# Patient Record
Sex: FEMALE | Race: WHITE | Employment: OTHER | URBAN - METROPOLITAN AREA
[De-identification: names, ages, dates, MRNs, and addresses within clinical notes are randomized per-mention and may not be internally consistent; named-entity substitution may affect disease eponyms.]

---

## 2022-06-02 ENCOUNTER — HOSPITAL ENCOUNTER (OUTPATIENT)
Dept: LAB | Age: 57
Discharge: HOME | End: 2022-06-02

## 2022-08-14 ENCOUNTER — HOSPITAL ENCOUNTER (EMERGENCY)
Age: 57
Discharge: HOME | End: 2022-08-14
Attending: EMERGENCY MEDICINE
Payer: COMMERCIAL

## 2022-08-14 ENCOUNTER — APPOINTMENT (OUTPATIENT)
Dept: GENERAL RADIOLOGY | Age: 57
End: 2022-08-14
Attending: EMERGENCY MEDICINE
Payer: COMMERCIAL

## 2022-08-14 VITALS
WEIGHT: 146.16 LBS | HEIGHT: 67 IN | BODY MASS INDEX: 22.94 KG/M2 | HEART RATE: 60 BPM | OXYGEN SATURATION: 95 % | DIASTOLIC BLOOD PRESSURE: 64 MMHG | SYSTOLIC BLOOD PRESSURE: 102 MMHG | TEMPERATURE: 97.7 F | RESPIRATION RATE: 11 BRPM

## 2022-08-14 DIAGNOSIS — R07.9 CHEST PAIN, UNSPECIFIED TYPE: Primary | ICD-10-CM

## 2022-08-14 LAB
ALBUMIN SERPL-MCNC: 4.2 G/DL (ref 3.5–5)
ALBUMIN/GLOB SERPL: 1.3 {RATIO} (ref 1.1–2.2)
ALP SERPL-CCNC: 54 U/L (ref 45–117)
ALT SERPL-CCNC: 25 U/L (ref 12–78)
ANION GAP SERPL CALC-SCNC: 7 MMOL/L (ref 5–15)
AST SERPL-CCNC: 17 U/L (ref 15–37)
ATRIAL RATE: 68 BPM
BASOPHILS # BLD: 0.1 K/UL (ref 0–0.1)
BASOPHILS NFR BLD: 1 % (ref 0–1)
BILIRUB SERPL-MCNC: 0.5 MG/DL (ref 0.2–1)
BUN SERPL-MCNC: 17 MG/DL (ref 6–20)
BUN/CREAT SERPL: 22 (ref 12–20)
CALCIUM SERPL-MCNC: 9 MG/DL (ref 8.5–10.1)
CALCULATED P AXIS, ECG09: -20 DEGREES
CALCULATED R AXIS, ECG10: 67 DEGREES
CALCULATED T AXIS, ECG11: -51 DEGREES
CHLORIDE SERPL-SCNC: 102 MMOL/L (ref 97–108)
CO2 SERPL-SCNC: 29 MMOL/L (ref 21–32)
COMMENT, HOLDF: NORMAL
CREAT SERPL-MCNC: 0.78 MG/DL (ref 0.55–1.02)
DIAGNOSIS, 93000: NORMAL
DIFFERENTIAL METHOD BLD: ABNORMAL
EOSINOPHIL # BLD: 0.4 K/UL (ref 0–0.4)
EOSINOPHIL NFR BLD: 8 % (ref 0–7)
ERYTHROCYTE [DISTWIDTH] IN BLOOD BY AUTOMATED COUNT: 12.9 % (ref 11.5–14.5)
GLOBULIN SER CALC-MCNC: 3.2 G/DL (ref 2–4)
GLUCOSE SERPL-MCNC: 105 MG/DL (ref 65–100)
HCT VFR BLD AUTO: 41.9 % (ref 35–47)
HGB BLD-MCNC: 13.9 G/DL (ref 11.5–16)
IMM GRANULOCYTES # BLD AUTO: 0 K/UL (ref 0–0.04)
IMM GRANULOCYTES NFR BLD AUTO: 0 % (ref 0–0.5)
LYMPHOCYTES # BLD: 1.2 K/UL (ref 0.8–3.5)
LYMPHOCYTES NFR BLD: 26 % (ref 12–49)
MCH RBC QN AUTO: 28.4 PG (ref 26–34)
MCHC RBC AUTO-ENTMCNC: 33.2 G/DL (ref 30–36.5)
MCV RBC AUTO: 85.5 FL (ref 80–99)
MONOCYTES # BLD: 0.4 K/UL (ref 0–1)
MONOCYTES NFR BLD: 10 % (ref 5–13)
NEUTS SEG # BLD: 2.4 K/UL (ref 1.8–8)
NEUTS SEG NFR BLD: 55 % (ref 32–75)
NRBC # BLD: 0 K/UL (ref 0–0.01)
NRBC BLD-RTO: 0 PER 100 WBC
P-R INTERVAL, ECG05: 184 MS
PLATELET # BLD AUTO: 196 K/UL (ref 150–400)
PMV BLD AUTO: 10.4 FL (ref 8.9–12.9)
POTASSIUM SERPL-SCNC: 3.9 MMOL/L (ref 3.5–5.1)
PROT SERPL-MCNC: 7.4 G/DL (ref 6.4–8.2)
Q-T INTERVAL, ECG07: 386 MS
QRS DURATION, ECG06: 80 MS
QTC CALCULATION (BEZET), ECG08: 410 MS
RBC # BLD AUTO: 4.9 M/UL (ref 3.8–5.2)
SAMPLES BEING HELD,HOLD: NORMAL
SODIUM SERPL-SCNC: 138 MMOL/L (ref 136–145)
TROPONIN-HIGH SENSITIVITY: 6 NG/L (ref 0–51)
VENTRICULAR RATE, ECG03: 68 BPM
WBC # BLD AUTO: 4.4 K/UL (ref 3.6–11)

## 2022-08-14 PROCEDURE — 80053 COMPREHEN METABOLIC PANEL: CPT

## 2022-08-14 PROCEDURE — 85025 COMPLETE CBC W/AUTO DIFF WBC: CPT

## 2022-08-14 PROCEDURE — 36415 COLL VENOUS BLD VENIPUNCTURE: CPT

## 2022-08-14 PROCEDURE — 99285 EMERGENCY DEPT VISIT HI MDM: CPT

## 2022-08-14 PROCEDURE — 84484 ASSAY OF TROPONIN QUANT: CPT

## 2022-08-14 PROCEDURE — 71046 X-RAY EXAM CHEST 2 VIEWS: CPT

## 2022-08-14 PROCEDURE — 93005 ELECTROCARDIOGRAM TRACING: CPT

## 2022-08-14 RX ORDER — IVERMECTIN 10 MG/G
CREAM TOPICAL
COMMUNITY

## 2022-08-14 NOTE — ED TRIAGE NOTES
Pt presents to ED with c/o intermittent chest pain over the past month, she states she has had some pain in neck jaw and shoulder but she states she grinds her teeth and has some issues with her shoulder. She denies any dyspnea, dizziness or nausea, vomiting.

## 2022-08-14 NOTE — ED PROVIDER NOTES
50-year-old female presents from home accompanied by her  with a complaint of chest pain. Patient states that she has had symptoms off and on for the past month or so. She describes it as a tight squeezing sensation below her left breast.  Sometimes it radiates into her left arm. This is not related to exertion or position and can happen at any time. She states this symptom usually lasts for a minute or 2 and then resolves on its own. No exacerbating or alleviating factors. She denies any associated symptoms like cough, shortness of breath, diaphoresis, nausea. She has not taken any medications for the symptoms. She had an episode of pain this morning that resolved on its own after 2 hours but this is what prompted her to come to the ER and be evaluated. She denies any history of heart disease. Does not take any daily medications. She denies having a primary care doctor. States she saw cardiologist 15 years ago for palpitations but has not seen them since. No other complaints at this time. History reviewed. No pertinent past medical history.     Past Surgical History:   Procedure Laterality Date    HX HEENT           Family History:   Problem Relation Age of Onset    Hypertension Father     Diabetes Father        Social History     Socioeconomic History    Marital status:      Spouse name: Not on file    Number of children: Not on file    Years of education: Not on file    Highest education level: Not on file   Occupational History    Not on file   Tobacco Use    Smoking status: Never    Smokeless tobacco: Never   Substance and Sexual Activity    Alcohol use: Yes     Comment: 3    Drug use: No    Sexual activity: Yes     Partners: Male     Birth control/protection: Surgical   Other Topics Concern    Not on file   Social History Narrative    Not on file     Social Determinants of Health     Financial Resource Strain: Not on file   Food Insecurity: Not on file   Transportation Needs: Not on file   Physical Activity: Not on file   Stress: Not on file   Social Connections: Not on file   Intimate Partner Violence: Not on file   Housing Stability: Not on file         ALLERGIES: Compazine [prochlorperazine edisylate] and Sulfa (sulfonamide antibiotics)    Review of Systems   Constitutional:  Negative for fever. HENT:  Negative for facial swelling. Eyes:  Negative for visual disturbance. Respiratory:  Positive for chest tightness. Cardiovascular:  Negative for chest pain. Gastrointestinal:  Negative for abdominal pain. Genitourinary:  Negative for difficulty urinating and dysuria. Musculoskeletal:  Negative for arthralgias. Skin:  Negative for rash. Neurological:  Negative for headaches. Hematological:  Negative for adenopathy. Psychiatric/Behavioral:  Negative for suicidal ideas. Vitals:    08/14/22 0827 08/14/22 0845 08/14/22 0848 08/14/22 0902   BP: 130/70 109/65 121/74 118/65   Pulse: 75 71 67    Resp: 19 19 13    Temp: 97.7 °F (36.5 °C)      SpO2: 98% 96% 96%    Weight: 66.3 kg (146 lb 2.6 oz)      Height: 5' 7\" (1.702 m)               Physical Exam  Vitals and nursing note reviewed. Constitutional:       General: She is not in acute distress. Appearance: She is well-developed. HENT:      Head: Normocephalic and atraumatic. Eyes:      General: No scleral icterus. Conjunctiva/sclera: Conjunctivae normal.      Pupils: Pupils are equal, round, and reactive to light. Cardiovascular:      Rate and Rhythm: Normal rate. Heart sounds: No murmur heard. Pulmonary:      Effort: Pulmonary effort is normal. No respiratory distress. Abdominal:      General: There is no distension. Musculoskeletal:         General: Normal range of motion. Cervical back: Normal range of motion and neck supple. Skin:     General: Skin is warm and dry. Findings: No rash. Neurological:      Mental Status: She is alert and oriented to person, place, and time. MDM  Number of Diagnoses or Management Options  Chest pain, unspecified type  Diagnosis management comments: Assessment: Patient here with brief, transient episodes of chest pain off and on for the past month. They are atypical with no exacerbating or alleviating factors. No associated symptoms. Patient very low risk with heart score of 1. Her cardiac enzymes and EKG were unremarkable here. Remainder of the blood test was reassuring. Patient is currently resting comfortable without pain at this time. I think she stable for discharge home. I encouraged her to follow-up with cardiology as she may benefit from a stress test.  She also needs to follow-up with primary care if she is well overdue for health maintenance check. I encouraged her to return to the emergency department if she has any new and concerning symptoms. Amount and/or Complexity of Data Reviewed  Clinical lab tests: reviewed  Tests in the radiology section of CPT®: reviewed      ED Course as of 08/14/22 0944   Sun Aug 14, 2022   0830 EKG, 12 LEAD, INITIAL  ED EKG interpretation:  Rhythm: normal sinus rhythm. Rate (approx.): 68.  Axis: normal.  ST segment:  No concerning ST elevations or depressions. This EKG was interpreted by John Skaggs MD,ED Provider.      [JM]      ED Course User Index  [JM] Poornima Brush MD       Procedures

## 2022-09-15 ENCOUNTER — LAB ONLY (OUTPATIENT)
Dept: FAMILY MEDICINE CLINIC | Age: 57
End: 2022-09-15

## 2022-09-15 ENCOUNTER — OFFICE VISIT (OUTPATIENT)
Dept: FAMILY MEDICINE CLINIC | Age: 57
End: 2022-09-15
Payer: COMMERCIAL

## 2022-09-15 VITALS
BODY MASS INDEX: 22.91 KG/M2 | RESPIRATION RATE: 16 BRPM | HEIGHT: 67 IN | DIASTOLIC BLOOD PRESSURE: 70 MMHG | OXYGEN SATURATION: 98 % | SYSTOLIC BLOOD PRESSURE: 112 MMHG | TEMPERATURE: 97.8 F | HEART RATE: 67 BPM | WEIGHT: 146 LBS

## 2022-09-15 DIAGNOSIS — G47.00 INSOMNIA, UNSPECIFIED TYPE: ICD-10-CM

## 2022-09-15 DIAGNOSIS — Z13.29 SCREENING FOR THYROID DISORDER: ICD-10-CM

## 2022-09-15 DIAGNOSIS — Z76.89 ESTABLISHING CARE WITH NEW DOCTOR, ENCOUNTER FOR: ICD-10-CM

## 2022-09-15 DIAGNOSIS — F41.9 ANXIETY: Primary | ICD-10-CM

## 2022-09-15 DIAGNOSIS — Z13.21 ENCOUNTER FOR VITAMIN DEFICIENCY SCREENING: ICD-10-CM

## 2022-09-15 DIAGNOSIS — Z13.220 SCREENING FOR CHOLESTEROL LEVEL: ICD-10-CM

## 2022-09-15 DIAGNOSIS — Z11.59 ENCOUNTER FOR HEPATITIS C SCREENING TEST FOR LOW RISK PATIENT: ICD-10-CM

## 2022-09-15 DIAGNOSIS — F41.9 ANXIETY: ICD-10-CM

## 2022-09-15 DIAGNOSIS — Z12.11 SCREEN FOR COLON CANCER: ICD-10-CM

## 2022-09-15 PROCEDURE — 99203 OFFICE O/P NEW LOW 30 MIN: CPT | Performed by: NURSE PRACTITIONER

## 2022-09-15 RX ORDER — KETOCONAZOLE 20 MG/G
CREAM TOPICAL
COMMUNITY
Start: 2022-07-22 | End: 2022-09-15 | Stop reason: ALTCHOICE

## 2022-09-15 RX ORDER — CETIRIZINE HYDROCHLORIDE 10 MG/1
CAPSULE, LIQUID FILLED ORAL
COMMUNITY

## 2022-09-15 NOTE — PROGRESS NOTES
HISTORY OF PRESENT ILLNESS  Elena Cramer is a 65 y.o. female. HPI  Pt presents as a new patient to establish care  She states that she has not seen PCP in years  She has been dealing with anxiety and sleep issues  She believes that there is just too much going on in her life currently. Her mother passed away in the past 2 years, her father has been diagnosed with alzheimer's and is moving in with her, etc.  She states that she will wake up around 2-3 am and is unable to fall back asleep. She has a good support system and a lot of friends, so feels as though she does not need therapy/counseling  She is interested in getting baseline blood work, to see if this could be making any of her symptoms worse. Pt declines any interest in medication for anxiety and/or depression at this time, as she would rather find long term solutions. She declines all HM topics due (mammogram, pap smear, etc). Review of Systems   Constitutional:  Negative for fever. Psychiatric/Behavioral:  The patient is nervous/anxious and has insomnia. Physical Exam  Constitutional:       Appearance: Normal appearance. Cardiovascular:      Rate and Rhythm: Normal rate and regular rhythm. Heart sounds: Normal heart sounds. Pulmonary:      Effort: Pulmonary effort is normal.      Breath sounds: Normal breath sounds. Neurological:      Mental Status: She is alert. Psychiatric:         Mood and Affect: Mood normal.         Behavior: Behavior normal.       ASSESSMENT and PLAN    ICD-10-CM ICD-9-CM    1. Anxiety  F41.9 300.00 CBC WITH AUTOMATED DIFF      METABOLIC PANEL, COMPREHENSIVE      LIPID PANEL      THYROID CASCADE PROFILE      HEPATITIS C AB      2. Establishing care with new doctor, encounter for  Z76.89 V65.8       3. Insomnia, unspecified type  G47.00 780.52       4. Screening for cholesterol level  Z13.220 V77.91 LIPID PANEL      5. Screening for thyroid disorder  Z13.29 V77.0 THYROID CASCADE PROFILE      6. Encounter for hepatitis C screening test for low risk patient  Z11.59 V73.89 HEPATITIS C AB      7. Encounter for vitamin deficiency screening  Z13.21 V77.99 VITAMIN D, 25 HYDROXY      8. Screen for colon cancer  Z12.11 V76.51 OCCULT BLOOD IMMUNOASSAY,DIAGNOSTIC      OCCULT BLOOD IMMUNOASSAY,DIAGNOSTIC      Will notify when labs return, and inform her of any change in plan of care at that time  Educated that should she change her mind about wanting to start anything for anxiety/depression, she should notify office. Pt informed to return to office with worsening of symptoms, or PRN with any questions or concerns. Pt verbalizes understanding of plan of care and denies further questions or concerns at this time.

## 2022-09-15 NOTE — PROGRESS NOTES
Identified pt with two pt identifiers(name and ). Chief Complaint   Patient presents with    New Patient    Labs     fasting    Sleep Problem    Anxiety        Health Maintenance Due   Topic    Hepatitis C Screening     Depression Screen     COVID-19 Vaccine (1)    Cervical cancer screen     Colorectal Cancer Screening Combo     Shingrix Vaccine Age 50> (1 of 2)    Breast Cancer Screen Mammogram     Lipid Screen     Flu Vaccine (1)       Wt Readings from Last 3 Encounters:   22 146 lb 2.6 oz (66.3 kg)   14 143 lb 9.6 oz (65.1 kg)   11 144 lb 3.2 oz (65.4 kg)     Temp Readings from Last 3 Encounters:   22 97.7 °F (36.5 °C)   14 98.5 °F (36.9 °C) (Oral)     BP Readings from Last 3 Encounters:   22 102/64   14 105/60   11 106/80     Pulse Readings from Last 3 Encounters:   22 60   14 78   11 70         Learning Assessment:  :     Learning Assessment 2014   PRIMARY LEARNER Patient   PRIMARY LANGUAGE ENGLISH   LEARNER PREFERENCE PRIMARY READING   ANSWERED BY self   RELATIONSHIP SELF       Depression Screening:  :     3 most recent PHQ Screens 9/15/2022   Little interest or pleasure in doing things Not at all   Feeling down, depressed, irritable, or hopeless Not at all   Total Score PHQ 2 0       Fall Risk Assessment:  :     No flowsheet data found. Abuse Screening:  :     Abuse Screening Questionnaire 9/15/2022   Do you ever feel afraid of your partner? N   Are you in a relationship with someone who physically or mentally threatens you? N   Is it safe for you to go home? Y       Coordination of Care Questionnaire:  :     1) Have you been to an emergency room, urgent care clinic since your last visit?  yes [facility] chest pain a few weeks ago   Hospitalized since your last visit? no             2) Have you seen or consulted any other health care providers outside of [address] [place] [address] since your last visit? no  (Include any pap smears or colon screenings in this section.)    3) Do you have an Advance Directive on file? no  Are you interested in receiving information about Advance Directives? no    Patient is accompanied by N/A I have received verbal consent from Fatimah Johnson to discuss any/all medical information while they are present in the room. 4.  For patients aged 39-70: Has the patient had a colonoscopy / FIT/ Cologuard? No      If the patient is female:    5. For patients aged 41-77: Has the patient had a mammogram within the past 2 years? No      6. For patients aged 21-65: Has the patient had a pap smear?  No

## 2022-09-16 ENCOUNTER — TELEPHONE (OUTPATIENT)
Dept: FAMILY MEDICINE CLINIC | Age: 57
End: 2022-09-16

## 2022-09-16 DIAGNOSIS — E55.9 VITAMIN D DEFICIENCY: Primary | ICD-10-CM

## 2022-09-16 LAB
25(OH)D3 SERPL-MCNC: 18.1 NG/ML (ref 30–100)
ALBUMIN SERPL-MCNC: 4.4 G/DL (ref 3.5–5)
ALBUMIN/GLOB SERPL: 1.4 {RATIO} (ref 1.1–2.2)
ALP SERPL-CCNC: 60 U/L (ref 45–117)
ALT SERPL-CCNC: 26 U/L (ref 12–78)
ANION GAP SERPL CALC-SCNC: 2 MMOL/L (ref 5–15)
AST SERPL-CCNC: 16 U/L (ref 15–37)
BASOPHILS # BLD: 0.1 K/UL (ref 0–0.1)
BASOPHILS NFR BLD: 1 % (ref 0–1)
BILIRUB SERPL-MCNC: 0.5 MG/DL (ref 0.2–1)
BUN SERPL-MCNC: 15 MG/DL (ref 6–20)
BUN/CREAT SERPL: 22 (ref 12–20)
CALCIUM SERPL-MCNC: 9.5 MG/DL (ref 8.5–10.1)
CHLORIDE SERPL-SCNC: 105 MMOL/L (ref 97–108)
CHOLEST SERPL-MCNC: 241 MG/DL
CO2 SERPL-SCNC: 31 MMOL/L (ref 21–32)
CREAT SERPL-MCNC: 0.68 MG/DL (ref 0.55–1.02)
DIFFERENTIAL METHOD BLD: NORMAL
EOSINOPHIL # BLD: 0.3 K/UL (ref 0–0.4)
EOSINOPHIL NFR BLD: 7 % (ref 0–7)
ERYTHROCYTE [DISTWIDTH] IN BLOOD BY AUTOMATED COUNT: 12.7 % (ref 11.5–14.5)
GLOBULIN SER CALC-MCNC: 3.1 G/DL (ref 2–4)
GLUCOSE SERPL-MCNC: 97 MG/DL (ref 65–100)
HCT VFR BLD AUTO: 44.4 % (ref 35–47)
HCV AB SERPL QL IA: NONREACTIVE
HDLC SERPL-MCNC: 67 MG/DL
HDLC SERPL: 3.6 {RATIO} (ref 0–5)
HGB BLD-MCNC: 14.2 G/DL (ref 11.5–16)
IMM GRANULOCYTES # BLD AUTO: 0 K/UL (ref 0–0.04)
IMM GRANULOCYTES NFR BLD AUTO: 0 % (ref 0–0.5)
LDLC SERPL CALC-MCNC: 158 MG/DL (ref 0–100)
LYMPHOCYTES # BLD: 1.1 K/UL (ref 0.8–3.5)
LYMPHOCYTES NFR BLD: 25 % (ref 12–49)
MCH RBC QN AUTO: 28.4 PG (ref 26–34)
MCHC RBC AUTO-ENTMCNC: 32 G/DL (ref 30–36.5)
MCV RBC AUTO: 88.8 FL (ref 80–99)
MONOCYTES # BLD: 0.4 K/UL (ref 0–1)
MONOCYTES NFR BLD: 8 % (ref 5–13)
NEUTS SEG # BLD: 2.7 K/UL (ref 1.8–8)
NEUTS SEG NFR BLD: 59 % (ref 32–75)
NRBC # BLD: 0 K/UL (ref 0–0.01)
NRBC BLD-RTO: 0 PER 100 WBC
PLATELET # BLD AUTO: 223 K/UL (ref 150–400)
PMV BLD AUTO: 10.9 FL (ref 8.9–12.9)
POTASSIUM SERPL-SCNC: 5.1 MMOL/L (ref 3.5–5.1)
PROT SERPL-MCNC: 7.5 G/DL (ref 6.4–8.2)
RBC # BLD AUTO: 5 M/UL (ref 3.8–5.2)
SODIUM SERPL-SCNC: 138 MMOL/L (ref 136–145)
TRIGL SERPL-MCNC: 80 MG/DL (ref ?–150)
VLDLC SERPL CALC-MCNC: 16 MG/DL
WBC # BLD AUTO: 4.5 K/UL (ref 3.6–11)

## 2022-09-16 RX ORDER — ERGOCALCIFEROL 1.25 MG/1
50000 CAPSULE ORAL
Qty: 12 CAPSULE | Refills: 1 | Status: SHIPPED | OUTPATIENT
Start: 2022-09-16

## 2022-09-16 NOTE — TELEPHONE ENCOUNTER
----- Message from Vita Osler, NP sent at 9/16/2022  2:11 PM EDT -----  Please call patient and let her know that her labs returned, awaiting thyroid. 1.  Vit D is very low. I have sent in rx and she should take as prescribed  2. Cholesterol is elevated, but HDL is also elevated which is cardioprotective.   Should focus on mediterranean diet, decreased red meat, fatty fried foods, etc.    Should re-check in 6 months, fasting

## 2022-09-16 NOTE — PROGRESS NOTES
Please call patient and let her know that her labs returned, awaiting thyroid. 1.  Vit D is very low. I have sent in rx and she should take as prescribed  2. Cholesterol is elevated, but HDL is also elevated which is cardioprotective.   Should focus on mediterranean diet, decreased red meat, fatty fried foods, etc.    Should re-check in 6 months, fasting

## 2022-09-17 LAB — TSH SERPL-ACNC: 0.88 UIU/ML (ref 0.45–4.5)

## 2022-09-22 LAB — HEMOCCULT STL QL IA: NEGATIVE

## 2022-09-23 ENCOUNTER — TELEPHONE (OUTPATIENT)
Dept: FAMILY MEDICINE CLINIC | Age: 57
End: 2022-09-23

## 2022-09-23 NOTE — TELEPHONE ENCOUNTER
----- Message from Gniette Cooney NP sent at 9/23/2022  7:45 AM EDT -----  Please call patient and let her know that her occult blood test returned negative, normal  Thanks

## 2022-10-03 ENCOUNTER — OFFICE VISIT (OUTPATIENT)
Dept: CARDIOLOGY CLINIC | Age: 57
End: 2022-10-03
Payer: COMMERCIAL

## 2022-10-03 VITALS
SYSTOLIC BLOOD PRESSURE: 118 MMHG | BODY MASS INDEX: 23.35 KG/M2 | OXYGEN SATURATION: 98 % | HEIGHT: 67 IN | WEIGHT: 148.8 LBS | HEART RATE: 82 BPM | DIASTOLIC BLOOD PRESSURE: 80 MMHG

## 2022-10-03 DIAGNOSIS — R07.9 CHEST PAIN, UNSPECIFIED TYPE: ICD-10-CM

## 2022-10-03 DIAGNOSIS — E78.5 DYSLIPIDEMIA: Primary | ICD-10-CM

## 2022-10-03 DIAGNOSIS — E55.9 VITAMIN D DEFICIENCY: ICD-10-CM

## 2022-10-03 DIAGNOSIS — Z76.89 ESTABLISHING CARE WITH NEW DOCTOR, ENCOUNTER FOR: ICD-10-CM

## 2022-10-03 PROCEDURE — 93000 ELECTROCARDIOGRAM COMPLETE: CPT | Performed by: INTERNAL MEDICINE

## 2022-10-03 PROCEDURE — 99204 OFFICE O/P NEW MOD 45 MIN: CPT | Performed by: INTERNAL MEDICINE

## 2022-10-03 NOTE — PROGRESS NOTES
Chief Complaint   Patient presents with    New Patient    Hospital Follow Up     8-14-22 [facility]    Chest Pain    Cholesterol Problem       Vitals:    10/03/22 1333   BP: 118/80   Pulse: 82   Height: 5' 7\" (1.702 m)   Weight: 148 lb 12.8 oz (67.5 kg)   SpO2: 98%         Chest pain: Yes, Pt stated anxiety-L side pressure    SOB: no    Palpitations: racing- Random     Dizziness: no    Swelling: no    Refills:       Have you been to the ER, urgent care clinic since your last visit? Hospitalized since your last visit? Have you sen or consulted other health care providers outside of the [facility] since your last visit?  (Include any pap smears or colon screening.)

## 2022-10-03 NOTE — PROGRESS NOTES
Reason to see patient: Chest pain. Referring: Tatianna Padilla NP    HPI: Shonda Downing is a 65 y.o. female with no significant past medical history is here for evaluation of symptoms of chest pain. Symptoms described as having chest pressure-like sensation. Symptoms have been present from last few weeks. Symptoms are off and on. Chest pressure described as mild to moderate in intensity anterior chest wall nonradiating not associated with shortness of breath, palpitations, lightheadedness or dizziness. Symptoms could be present at any times but there is no relationship to exertion. There is no aggravating or relieving factors. Had been seen in the ER on August 14, 2022 and all the lab results including EKGs were normal.  She was asked to follow-up with cardiology. She had in the interim seen primary care physician and had lab work performed. Her most recent cholesterol levels were elevated as well as her vitamin D levels were low. EKG in my office today demonstrated normal sinus rhythm, normal axis, normal intervals, normal ST segment. Lab results were personally reviewed as documented above. She does not smoke tobacco.  She will occasionally drink alcohol socially. She likes to drink wine or cider. Family history significant for mother having pulmonary fibrosis past 2 years ago, father has Alzheimer's disease as well as coronary disease with stenting performed in the last 10 to 20 years. He is at [de-identified]years of age. Socially she has significant stress due to father's Alzheimer and also evaluation of her home. Plan:    1. Chest pain: Symptoms suggestive of atypical angina likely due to reflux related could be exacerbated by current stressful situations at home. Nevertheless given her age as well as family history of CAD we will proceed with some basic testing. We will perform coronary calcium score.   Also check a treadmill exercise echocardiogram.  If these results are fairly normal then no further testing is needed. Continue taking Tums as needed. May require PPI. 2.  Dyslipidemia: Most recent LDL level was 158 with a total cholesterol of 241. Would recommend dietary observation as well as rechecking these levels within the next 2 months. 3.  Low vitamin D levels: Continue lifelong supplementation of vitamin D.    4. See Dr. Tone Maldonado in 1 month. Her kids are grown and her son is getting  in 2days. Her Father has Alziemers and had CAD, PCI. Mother had pulmonary fibrosis. ATTENTION:   This medical record was transcribed using an electronic medical records/speech recognition system. Although proofread, it may and can contain electronic, spelling and other errors. Corrections may be executed at a later time. Please feel free to contact us for any clarifications as needed.       Past Medical History:   Diagnosis Date    Anxiety             Past Surgical History:   Procedure Laterality Date    HX HEENT               Family History   Problem Relation Age of Onset    Hypertension Father     Diabetes Father            Social History     Socioeconomic History    Marital status:      Spouse name: Not on file    Number of children: Not on file    Years of education: Not on file    Highest education level: Not on file   Occupational History    Not on file   Tobacco Use    Smoking status: Never    Smokeless tobacco: Never   Substance and Sexual Activity    Alcohol use: Yes     Comment: 3    Drug use: No    Sexual activity: Yes     Partners: Male     Birth control/protection: Surgical   Other Topics Concern    Not on file   Social History Narrative    Not on file     Social Determinants of Health     Financial Resource Strain: Not on file   Food Insecurity: Not on file   Transportation Needs: Not on file   Physical Activity: Not on file   Stress: Not on file   Social Connections: Not on file   Intimate Partner Violence: Not on file   Housing Stability: Not on file         Allergies   Allergen Reactions    Compazine [Prochlorperazine Edisylate] Anaphylaxis    Sulfa (Sulfonamide Antibiotics) Rash            Current Outpatient Medications   Medication Sig Dispense Refill    ergocalciferol (ERGOCALCIFEROL) 1,250 mcg (50,000 unit) capsule Take 1 Capsule by mouth every seven (7) days. 12 Capsule 1    Cetirizine (ZyrTEC) 10 mg cap Take  by mouth. doxylamine succinate (UNISOM) 25 mg tablet Take 25 mg by mouth nightly as needed. ivermectin 1 % crea by Apply Externally route. ROS:  12 point review of systems was performed.  All negative except for HPI     Physical Exam:  Visit Vitals  /80   Pulse 82   Ht 5' 7\" (1.702 m)   Wt 148 lb 12.8 oz (67.5 kg)   SpO2 98%   BMI 23.31 kg/m²       Gen:  Well-developed, well-nourished, in no acute distress  HEENT:  Pink conjunctivae, PERRL, hearing intact to voice, moist mucous membranes  Neck:  Supple, without masses, thyroid non-tender  Resp:  No accessory muscle use, clear breath sounds without wheezes rales or rhonchi  Card:  No murmurs, normal S1, S2 without thrills, bruits or peripheral edema  Abd:  Soft, non-tender, non-distended, normoactive bowel sounds are present, no palpable organomegaly and no detectable hernias  Lymph:  No cervical or inguinal adenopathy  Musc:  No cyanosis or clubbing  Skin:  No rashes or ulcers, skin turgor is good  Neuro:  Cranial nerves are grossly intact, no focal motor weakness, follows commands appropriately  Psych:  Good insight, oriented to person, place and time, alert     Labs:     Lab Results   Component Value Date/Time    WBC 4.5 09/15/2022 10:30 AM    HGB 14.2 09/15/2022 10:30 AM    HCT 44.4 09/15/2022 10:30 AM    PLATELET 860 88/06/1854 10:30 AM    MCV 88.8 09/15/2022 10:30 AM     Lab Results   Component Value Date/Time    Glucose 97 09/15/2022 10:30 AM    LDL, calculated 158 (H) 09/15/2022 10:30 AM    Creatinine 0.68 09/15/2022 10:30 AM      Lab Results   Component Value Date/Time    Cholesterol, total 241 (H) 09/15/2022 10:30 AM    HDL Cholesterol 67 09/15/2022 10:30 AM    LDL, calculated 158 (H) 09/15/2022 10:30 AM    Triglyceride 80 09/15/2022 10:30 AM    CHOL/HDL Ratio 3.6 09/15/2022 10:30 AM     Lab Results   Component Value Date/Time    ALT (SGPT) 26 09/15/2022 10:30 AM    Alk.  phosphatase 60 09/15/2022 10:30 AM    Bilirubin, total 0.5 09/15/2022 10:30 AM    Albumin 4.4 09/15/2022 10:30 AM    Protein, total 7.5 09/15/2022 10:30 AM    PLATELET 144 29/74/5106 10:30 AM     No results found for: INR, INREXT, PTMR, PTP, PT1, PT2, INREXT   Lab Results   Component Value Date/Time    GFR est non-AA >60 09/15/2022 10:30 AM    GFR est AA >60 09/15/2022 10:30 AM    Creatinine 0.68 09/15/2022 10:30 AM    BUN 15 09/15/2022 10:30 AM    Sodium 138 09/15/2022 10:30 AM    Potassium 5.1 09/15/2022 10:30 AM    Chloride 105 09/15/2022 10:30 AM    CO2 31 09/15/2022 10:30 AM     No results found for: PSA, Roel Gourd, [id number], [id number]  Lab Results   Component Value Date/Time    TSH 0.883 09/15/2022 10:30 AM    TSH 1.460 02/05/2014 08:53 AM      Lab Results   Component Value Date/Time    Glucose 97 09/15/2022 10:30 AM      No results found for: CPK, RCK1, RCK2, RCK3, RCK4, CKMB, CKNDX, CKND1, TROPT, TROIQ, BNPP, BNP   No results found for: BNP, BNPP, BNPPPOC, XBNPT, BNPNT   Lab Results   Component Value Date/Time    Sodium 138 09/15/2022 10:30 AM    Potassium 5.1 09/15/2022 10:30 AM    Chloride 105 09/15/2022 10:30 AM    CO2 31 09/15/2022 10:30 AM    Anion gap 2 (L) 09/15/2022 10:30 AM    Glucose 97 09/15/2022 10:30 AM    BUN 15 09/15/2022 10:30 AM    Creatinine 0.68 09/15/2022 10:30 AM    BUN/Creatinine ratio 22 (H) 09/15/2022 10:30 AM    GFR est AA >60 09/15/2022 10:30 AM    GFR est non-AA >60 09/15/2022 10:30 AM    Calcium 9.5 09/15/2022 10:30 AM      Lab Results   Component Value Date/Time    Sodium 138 09/15/2022 10:30 AM    Potassium 5.1 09/15/2022 10:30 AM Chloride 105 09/15/2022 10:30 AM    CO2 31 09/15/2022 10:30 AM    Anion gap 2 (L) 09/15/2022 10:30 AM    Glucose 97 09/15/2022 10:30 AM    BUN 15 09/15/2022 10:30 AM    Creatinine 0.68 09/15/2022 10:30 AM    BUN/Creatinine ratio 22 (H) 09/15/2022 10:30 AM    GFR est AA >60 09/15/2022 10:30 AM    GFR est non-AA >60 09/15/2022 10:30 AM    Calcium 9.5 09/15/2022 10:30 AM    Bilirubin, total 0.5 09/15/2022 10:30 AM    ALT (SGPT) 26 09/15/2022 10:30 AM    Alk. phosphatase 60 09/15/2022 10:30 AM    Protein, total 7.5 09/15/2022 10:30 AM    Albumin 4.4 09/15/2022 10:30 AM    Globulin 3.1 09/15/2022 10:30 AM    A-G Ratio 1.4 09/15/2022 10:30 AM      No results found for: HBA1C, RTC2NYFT, JIW4SSNT, AVW0FWBK      No results for input(s): CPK, CKMB, TROIQ in the last 72 hours. No lab exists for component: CKQMB, CPKMB        Problem List:     Problem List  Date Reviewed: 2/4/2014            Codes Class Noted    Plantar wart of right foot ICD-10-CM: B07.0  ICD-9-CM: 078.12  1/20/2011             Catarino Zarate MD, [facility]

## 2022-10-03 NOTE — PROGRESS NOTES
All orders entered per verbal orders of Dr. Elodia Simon. MD Karyn    Stress Echo- Chest pain     CAC score- Chest pain. Irena Martell PT  See Dr. Ivania Roach in 1 month.

## 2022-10-10 RX ORDER — BUSPIRONE HYDROCHLORIDE 7.5 MG/1
7.5 TABLET ORAL 2 TIMES DAILY
Qty: 60 TABLET | Refills: 2 | Status: SHIPPED | OUTPATIENT
Start: 2022-10-10

## 2022-10-17 ENCOUNTER — OFFICE VISIT (OUTPATIENT)
Dept: FAMILY MEDICINE CLINIC | Age: 57
End: 2022-10-17
Payer: COMMERCIAL

## 2022-10-17 VITALS
WEIGHT: 149 LBS | BODY MASS INDEX: 23.39 KG/M2 | SYSTOLIC BLOOD PRESSURE: 122 MMHG | HEART RATE: 70 BPM | OXYGEN SATURATION: 98 % | DIASTOLIC BLOOD PRESSURE: 82 MMHG | RESPIRATION RATE: 16 BRPM | TEMPERATURE: 97.9 F | HEIGHT: 67 IN

## 2022-10-17 DIAGNOSIS — G47.00 INSOMNIA, UNSPECIFIED TYPE: ICD-10-CM

## 2022-10-17 DIAGNOSIS — F41.9 ANXIETY: Primary | ICD-10-CM

## 2022-10-17 PROCEDURE — 99213 OFFICE O/P EST LOW 20 MIN: CPT | Performed by: NURSE PRACTITIONER

## 2022-10-17 NOTE — PROGRESS NOTES
HISTORY OF PRESENT ILLNESS  Elena Dhaliwal is a 65 y.o. female. HPI  Pt presents with \"anxiety\"  Pt states that she has continued to deal with some anxiety and stress  She sent [place] message last week, and was ready to start some medication. She has been taking the Buspar 7.5 mg BID as prescribed. She states that her anxiety is still present, but does seem to feel less intense at this time. She is interested in continuing the Buspar, in the hopes that more time will continue to benefit her and her symptoms. She has battled insomnia. She is able to fall asleep, but not able to stay asleep. She states that she wakes up and feels like her mind is not able to stop going. She is interested in trying some OTC herbal supplements, before trying any medication  Review of Systems   Constitutional:  Negative for fever. Psychiatric/Behavioral:  The patient is nervous/anxious and has insomnia. Physical Exam  Constitutional:       Appearance: Normal appearance. Cardiovascular:      Rate and Rhythm: Normal rate and regular rhythm. Heart sounds: Normal heart sounds. Pulmonary:      Effort: Pulmonary effort is normal.      Breath sounds: Normal breath sounds. Neurological:      Mental Status: She is alert. Psychiatric:         Mood and Affect: Mood normal.         Behavior: Behavior normal.       ASSESSMENT and PLAN    ICD-10-CM ICD-9-CM    1. Anxiety  F41.9 300.00       2. Insomnia, unspecified type  G47.00 780.52         Informed patient to notify office should she change her mind, and want to change or add in new medications  Educated about options, such as Atarax for sleep, SSRI and/or increasing Buspar dose. Pt informed to return to office with worsening of symptoms, or PRN with any questions or concerns. Pt verbalizes understanding of plan of care and denies further questions or concerns at this time.

## 2022-10-17 NOTE — PROGRESS NOTES
Identified pt with two pt identifiers(name and ). Chief Complaint   Patient presents with    Anxiety     1 week since starting medication    Sleep Problem        Health Maintenance Due   Topic    COVID-19 Vaccine (1)    Cervical cancer screen     Shingrix Vaccine Age 50> (1 of 2)    Breast Cancer Screen Mammogram     Flu Vaccine (1)       Wt Readings from Last 3 Encounters:   10/17/22 149 lb (67.6 kg)   10/03/22 148 lb 12.8 oz (67.5 kg)   09/15/22 146 lb (66.2 kg)     Temp Readings from Last 3 Encounters:   10/17/22 97.9 °F (36.6 °C) (Temporal)   09/15/22 97.8 °F (36.6 °C) (Temporal)   22 97.7 °F (36.5 °C)     BP Readings from Last 3 Encounters:   10/17/22 122/82   10/03/22 118/80   09/15/22 112/70     Pulse Readings from Last 3 Encounters:   10/17/22 70   10/03/22 82   09/15/22 67         Learning Assessment:  :     Learning Assessment 2014   PRIMARY LEARNER Patient   PRIMARY LANGUAGE ENGLISH   LEARNER PREFERENCE PRIMARY READING   ANSWERED BY self   RELATIONSHIP SELF       Depression Screening:  :     3 most recent PHQ Screens 10/17/2022   Little interest or pleasure in doing things Not at all   Feeling down, depressed, irritable, or hopeless Not at all   Total Score PHQ 2 0       Fall Risk Assessment:  :     No flowsheet data found. Abuse Screening:  :     Abuse Screening Questionnaire 10/17/2022 9/15/2022   Do you ever feel afraid of your partner? N N   Are you in a relationship with someone who physically or mentally threatens you? N N   Is it safe for you to go home? Y Y       Coordination of Care Questionnaire:  :     1) Have you been to an emergency room, urgent care clinic since your last visit? no   Hospitalized since your last visit? no             2) Have you seen or consulted any other health care providers outside of [address] [place] [address] since your last visit? no  (Include any pap smears or colon screenings in this section.)    3) Do you have an Advance Directive on file?  no  Are you interested in receiving information about Advance Directives? no    Patient is accompanied by N/A  I have received verbal consent from Cheyenne Carnes to discuss any/all medical information while they are present in the room. 4.  For patients aged 39-70: Has the patient had a colonoscopy / FIT/ Cologuard? Yes - no Care Gap present      If the patient is female:    5. For patients aged 41-77: Has the patient had a mammogram within the past 2 years? No      6. For patients aged 21-65: Has the patient had a pap smear?  No

## 2022-10-19 ENCOUNTER — HOSPITAL ENCOUNTER (OUTPATIENT)
Dept: CT IMAGING | Age: 57
Discharge: HOME | End: 2022-10-19
Attending: INTERNAL MEDICINE
Payer: SELF-PAY

## 2022-10-19 DIAGNOSIS — R07.9 CHEST PAIN, UNSPECIFIED TYPE: ICD-10-CM

## 2022-10-19 PROCEDURE — 75571 CT HRT W/O DYE W/CA TEST: CPT

## 2022-11-29 ENCOUNTER — APPOINTMENT (OUTPATIENT)
Dept: CARDIOLOGY CLINIC | Age: 57
End: 2022-11-29

## 2022-11-29 ENCOUNTER — ANCILLARY PROCEDURE (OUTPATIENT)
Dept: CARDIOLOGY CLINIC | Age: 57
End: 2022-11-29
Payer: COMMERCIAL

## 2022-11-29 VITALS — HEIGHT: 67 IN | WEIGHT: 149 LBS | BODY MASS INDEX: 23.39 KG/M2

## 2022-11-29 DIAGNOSIS — R07.9 CHEST PAIN, UNSPECIFIED TYPE: ICD-10-CM

## 2022-11-29 LAB
STRESS ANGINA INDEX: 0
STRESS BASELINE DIAS BP: 68 MMHG
STRESS BASELINE HR: 73 BPM
STRESS BASELINE ST DEPRESSION: 0 MM
STRESS BASELINE SYS BP: 130 MMHG
STRESS ESTIMATED WORKLOAD: 9.8 METS
STRESS EXERCISE DUR MIN: 6 MIN
STRESS EXERCISE DUR SEC: 55 SEC
STRESS O2 SAT PEAK: 98 %
STRESS O2 SAT REST: 99 %
STRESS PEAK DIAS BP: 70 MMHG
STRESS PEAK SYS BP: 136 MMHG
STRESS PERCENT HR ACHIEVED: 95 %
STRESS POST PEAK HR: 155 BPM
STRESS RATE PRESSURE PRODUCT: NORMAL BPM*MMHG
STRESS TARGET HR: 164 BPM

## 2022-11-29 PROCEDURE — 93351 STRESS TTE COMPLETE: CPT | Performed by: INTERNAL MEDICINE

## 2023-03-06 ENCOUNTER — LAB ONLY (OUTPATIENT)
Dept: FAMILY MEDICINE CLINIC | Age: 58
End: 2023-03-06

## 2023-03-06 ENCOUNTER — OFFICE VISIT (OUTPATIENT)
Dept: FAMILY MEDICINE CLINIC | Age: 58
End: 2023-03-06
Payer: COMMERCIAL

## 2023-03-06 VITALS
OXYGEN SATURATION: 98 % | DIASTOLIC BLOOD PRESSURE: 74 MMHG | SYSTOLIC BLOOD PRESSURE: 121 MMHG | BODY MASS INDEX: 23.64 KG/M2 | HEART RATE: 80 BPM | TEMPERATURE: 99.8 F | RESPIRATION RATE: 20 BRPM | HEIGHT: 68 IN | WEIGHT: 156 LBS

## 2023-03-06 DIAGNOSIS — G47.30 SLEEP DISORDER BREATHING: ICD-10-CM

## 2023-03-06 DIAGNOSIS — G89.29 CHRONIC LOW BACK PAIN WITHOUT SCIATICA, UNSPECIFIED BACK PAIN LATERALITY: ICD-10-CM

## 2023-03-06 DIAGNOSIS — G89.29 HIP PAIN, CHRONIC, RIGHT: ICD-10-CM

## 2023-03-06 DIAGNOSIS — M25.551 HIP PAIN, CHRONIC, RIGHT: ICD-10-CM

## 2023-03-06 DIAGNOSIS — R07.81 RIB PAIN ON RIGHT SIDE: Primary | ICD-10-CM

## 2023-03-06 DIAGNOSIS — L65.9 ALOPECIA: ICD-10-CM

## 2023-03-06 DIAGNOSIS — R23.2 HOT FLASHES: ICD-10-CM

## 2023-03-06 DIAGNOSIS — M54.50 CHRONIC LOW BACK PAIN WITHOUT SCIATICA, UNSPECIFIED BACK PAIN LATERALITY: ICD-10-CM

## 2023-03-06 DIAGNOSIS — E55.9 VITAMIN D DEFICIENCY: ICD-10-CM

## 2023-03-06 DIAGNOSIS — F41.9 ANXIETY: ICD-10-CM

## 2023-03-06 PROCEDURE — 99214 OFFICE O/P EST MOD 30 MIN: CPT | Performed by: FAMILY MEDICINE

## 2023-03-06 RX ORDER — CLOBETASOL PROPIONATE 0.46 MG/ML
SOLUTION TOPICAL
COMMUNITY
Start: 2023-02-09

## 2023-03-06 RX ORDER — CHOLECALCIFEROL TAB 125 MCG (5000 UNIT) 125 MCG
5000 TAB ORAL DAILY
COMMUNITY

## 2023-03-06 RX ORDER — ASCORBIC ACID 125 MG
200 TABLET,CHEWABLE ORAL
COMMUNITY

## 2023-03-06 NOTE — PROGRESS NOTES
Identified pt with two pt identifiers(name and ). Chief Complaint   Patient presents with    Rib Pain     Started a few weeks ago        Health Maintenance Due   Topic    COVID-19 Vaccine (1)    Cervical cancer screen     Shingles Vaccine (1 of 2)    Breast Cancer Screen Mammogram        Wt Readings from Last 3 Encounters:   23 156 lb (70.8 kg)   22 149 lb (67.6 kg)   10/17/22 149 lb (67.6 kg)     Temp Readings from Last 3 Encounters:   23 99.8 °F (37.7 °C) (Temporal)   10/17/22 97.9 °F (36.6 °C) (Temporal)   09/15/22 97.8 °F (36.6 °C) (Temporal)     BP Readings from Last 3 Encounters:   23 121/74   10/17/22 122/82   10/03/22 118/80     Pulse Readings from Last 3 Encounters:   23 80   10/17/22 70   10/03/22 82         Learning Assessment:  :     Learning Assessment 2014   PRIMARY LEARNER Patient   PRIMARY LANGUAGE ENGLISH   LEARNER PREFERENCE PRIMARY READING   ANSWERED BY self   RELATIONSHIP SELF       Depression Screening:  :     3 most recent PHQ Screens 3/6/2023   Little interest or pleasure in doing things Not at all   Feeling down, depressed, irritable, or hopeless Not at all   Total Score PHQ 2 0       Fall Risk Assessment:  :     No flowsheet data found. Abuse Screening:  :     Abuse Screening Questionnaire 3/6/2023 10/17/2022 9/15/2022   Do you ever feel afraid of your partner? N N N   Are you in a relationship with someone who physically or mentally threatens you? N N N   Is it safe for you to go home? Kayla Leiva       Coordination of Care Questionnaire:  :     1) Have you been to an emergency room, urgent care clinic since your last visit? no   Hospitalized since your last visit? no             2) Have you seen or consulted any other health care providers outside of [address] [place] [address] since your last visit? no  chiropractor and dentist  (Include any pap smears or colon screenings in this section.)    3) Do you have an Advance Directive on file?  no  Are you interested in receiving information about Advance Directives? yes    Patient is accompanied by self I have received verbal consent from Melanie Garcia to discuss any/all medical information while they are present in the room. 4.  For patients aged 39-70: Has the patient had a colonoscopy / FIT/ Cologuard? No Cologard       If the patient is female:    5. For patients aged 41-77: Has the patient had a mammogram within the past 2 years? No      6. For patients aged 21-65: Has the patient had a pap smear?  No

## 2023-03-07 PROBLEM — E55.9 VITAMIN D DEFICIENCY: Status: ACTIVE | Noted: 2023-03-07

## 2023-03-07 PROBLEM — R23.2 HOT FLASHES: Status: ACTIVE | Noted: 2023-03-07

## 2023-03-07 PROBLEM — L65.9 ALOPECIA: Status: ACTIVE | Noted: 2023-03-07

## 2023-03-07 PROBLEM — F41.9 ANXIETY: Status: ACTIVE | Noted: 2023-03-07

## 2023-03-07 LAB
25(OH)D3 SERPL-MCNC: 33.6 NG/ML (ref 30–100)
CRP SERPL-MCNC: <0.29 MG/DL (ref 0–0.6)
ERYTHROCYTE [SEDIMENTATION RATE] IN BLOOD: 5 MM/HR (ref 0–30)

## 2023-03-08 ENCOUNTER — PATIENT MESSAGE (OUTPATIENT)
Dept: SLEEP MEDICINE | Age: 58
End: 2023-03-08

## 2023-03-08 LAB
CENTROMERE B AB SER-ACNC: <0.2 AI (ref 0–0.9)
CHROMATIN AB SERPL-ACNC: <0.2 AI (ref 0–0.9)
DSDNA AB SER-ACNC: <1 IU/ML (ref 0–9)
ENA JO1 AB SER-ACNC: <0.2 AI (ref 0–0.9)
ENA RNP AB SER-ACNC: <0.2 AI (ref 0–0.9)
ENA SCL70 AB SER-ACNC: <0.2 AI (ref 0–0.9)
ENA SM AB SER-ACNC: <0.2 AI (ref 0–0.9)
ENA SM+RNP AB SER-ACNC: <0.2 AI (ref 0–0.9)
ENA SS-A AB SER-ACNC: 0.2 AI (ref 0–0.9)
ENA SS-B AB SER-ACNC: <0.2 AI (ref 0–0.9)
RIBOSOMAL P AB SER-ACNC: <0.2 AI (ref 0–0.9)
SEE BELOW:, 164879: NORMAL

## 2023-03-08 NOTE — PROGRESS NOTES
HISTORY OF PRESENT ILLNESS  Elena Pendleton is a 65 y.o. female. New pt to this provider. HPI  Pt presents with a list of complaints. -  pain R rib cage. No hx trauma. Started 2 weeks ago. Pain with movement and deep breathing. Feels bruised. Not sharp. No dyspnea. -  alopecia on scalp. Treated by DERM with steroid injections and topical steroid. -  R sided low back / hip pain. Went to [facility]. XR taken and told she had sacroiliitis. Not feeling better. She does not like to take medicines. Tried Ibuprofen for a few doses. -  under stress. She moved her elderly father to live with them. He has dementia. -  low Vit D.  -  hot flashes.  - GERD? Pt is interested is rule out autoimmune disorder that may link all her symptoms. ROS  Visit Vitals  /74 (BP 1 Location: Left arm, BP Patient Position: Sitting, BP Cuff Size: Adult)   Pulse 80   Temp 99.8 °F (37.7 °C) (Temporal)   Resp 20   Ht 5' 8\" (1.727 m)   Wt 156 lb (70.8 kg)   LMP 07/16/2022 (Approximate)   SpO2 98%   BMI 23.72 kg/m²       Physical Exam  Vitals reviewed. Constitutional:       Appearance: Normal appearance. Chest:      Chest wall: Tenderness present. No mass, deformity or swelling. Musculoskeletal:      Lumbar back: Tenderness present. Back:    Neurological:      Mental Status: She is alert. ASSESSMENT and PLAN    ICD-10-CM ICD-9-CM    1. Rib pain on right side  R07.81 786.50 XR CHEST PA LAT      2. Sleep disorder breathing  G47.30 780.59 SLEEP MEDICINE REFERRAL      3. Hip pain, chronic, right  M25.551 719.45 SED RATE (ESR)    G89.29 338.29 C REACTIVE PROTEIN, QT      JULIAN COMPREHENSIVE PLUS PANEL      REFERRAL TO PAIN MANAGEMENT      4. Vitamin D deficiency  E55.9 268.9 VITAMIN D, 25 HYDROXY      5. Anxiety  F41.9 300.00       6. Chronic low back pain without sciatica, unspecified back pain laterality  M54.50 724.2     G89.29 338.29       7. Alopecia  L65.9 704.00       8.  Hot flashes  R23.2 782.62 Order labs. Check markers inflammation, JULIAN. Check Vit D.   Refer to physiatrist Dr Brunson Ask for back pain

## 2023-03-08 NOTE — PROGRESS NOTES
Negative tests for autoimmune disease. Normal markers for inflammation. Vit D is now in good range. I recommend she see Dr Sonia Solorio for hip/ back pain. Waiting on chest x-ray.

## 2023-03-09 ENCOUNTER — HOSPITAL ENCOUNTER (OUTPATIENT)
Dept: GENERAL RADIOLOGY | Age: 58
Discharge: HOME | End: 2023-03-09
Payer: COMMERCIAL

## 2023-03-09 ENCOUNTER — TELEPHONE (OUTPATIENT)
Dept: FAMILY MEDICINE CLINIC | Age: 58
End: 2023-03-09

## 2023-03-09 DIAGNOSIS — R07.81 RIB PAIN ON RIGHT SIDE: ICD-10-CM

## 2023-03-09 PROCEDURE — 71046 X-RAY EXAM CHEST 2 VIEWS: CPT

## 2023-03-09 NOTE — TELEPHONE ENCOUNTER
----- Message from Lavinia Piper MD sent at 3/8/2023  6:46 PM EST -----  Negative tests for autoimmune disease. Normal markers for inflammation. Vit D is now in good range. I recommend she see Dr Blake Last for hip/ back pain. Waiting on chest x-ray.

## 2023-03-09 NOTE — TELEPHONE ENCOUNTER
----- Message from Barbra Lewis MD sent at 3/9/2023  3:43 PM EST -----  Advise pt normal chest x-ray. No rib fracture. Normal lungs.

## 2023-03-20 ENCOUNTER — DOCUMENTATION ONLY (OUTPATIENT)
Dept: SLEEP MEDICINE | Age: 58
End: 2023-03-20

## 2023-03-20 ENCOUNTER — OFFICE VISIT (OUTPATIENT)
Dept: SLEEP MEDICINE | Age: 58
End: 2023-03-20
Payer: COMMERCIAL

## 2023-03-20 VITALS
OXYGEN SATURATION: 97 % | HEART RATE: 75 BPM | BODY MASS INDEX: 24.42 KG/M2 | HEIGHT: 67 IN | WEIGHT: 155.6 LBS | SYSTOLIC BLOOD PRESSURE: 105 MMHG | DIASTOLIC BLOOD PRESSURE: 69 MMHG

## 2023-03-20 DIAGNOSIS — Z86.59 H/O ANXIETY DISORDER: ICD-10-CM

## 2023-03-20 DIAGNOSIS — Z72.821 INADEQUATE SLEEP HYGIENE: ICD-10-CM

## 2023-03-20 DIAGNOSIS — Z87.09 H/O ALLERGIC RHINITIS: ICD-10-CM

## 2023-03-20 DIAGNOSIS — G47.33 OSA (OBSTRUCTIVE SLEEP APNEA): Primary | ICD-10-CM

## 2023-03-20 PROCEDURE — 99204 OFFICE O/P NEW MOD 45 MIN: CPT | Performed by: INTERNAL MEDICINE

## 2023-03-20 NOTE — PATIENT INSTRUCTIONS
[facility] [place], [address]  Tel.  [phone]  Fax. [facility] [address]  [place], [address] [facility]  Tel.  [phone]  Fax. [phone] [address]  Tel.  [phone]  Fax. [phone]     Sleep Apnea: After Your Visit  Your Care Instructions  Sleep apnea occurs when you frequently stop breathing for 10 seconds or longer during sleep. It can be mild to severe, based on the number of times per hour that you stop breathing or have slowed breathing. Blocked or narrowed airways in your nose, mouth, or throat can cause sleep apnea. Your airway can become blocked when your throat muscles and tongue relax during sleep. Sleep apnea is common, occurring in 1 out of 20 individuals. Individuals having any of the following characteristics should be evaluated and treated right away due to high risk and detrimental consequences from untreated sleep apnea:  Obesity  Congestive Heart failure  Atrial Fibrillation  Uncontrolled Hypertension  Type II Diabetes  Night-time Arrhythmias  Stroke  Pulmonary Hypertension  High-risk Driving Populations (pilots, truck drivers, etc.)  Patients Considering Weight-loss Surgery    How do you know you have sleep apnea? You probably have sleep apnea if you answer 'yes' to 3 or more of the following questions:  S - Have you been told that you Snore? T - Are you often Tired during the day? O - Has anyone Observed you stop breathing while sleeping? P- Do you have (or are being treated for) high blood Pressure? B - Are you obese (Body Mass Index > 35)? A - Is your Age 48years old or older? N - Is your Neck size greater than 16 inches? G - Are you male Gender? A sleep physician can prescribe a breathing device that prevents tissues in the throat from blocking your airway. Or your doctor may recommend using a dental device (oral breathing device) to help keep your airway open.  In some cases, surgery may be needed to remove enlarged tissues in the throat. Follow-up care is a key part of your treatment and safety. Be sure to make and go to all appointments, and call your doctor if you are having problems. It's also a good idea to know your test results and keep a list of the medicines you take. How can you care for yourself at home? Lose weight, if needed. It may reduce the number of times you stop breathing or have slowed breathing. Go to bed at the same time every night. Sleep on your side. It may stop mild apnea. If you tend to roll onto your back, sew a pocket in the back of your paRegBinder top. Put a tennis ball into the pocket, and stitch the pocket shut. This will help keep you from sleeping on your back. Avoid alcohol and medicines such as sleeping pills and sedatives before bed. Do not smoke. Smoking can make sleep apnea worse. If you need help quitting, talk to your doctor about stop-smoking programs and medicines. These can increase your chances of quitting for good. Prop up the head of your bed 4 to 6 inches by putting bricks under the legs of the bed. Treat breathing problems, such as a stuffy nose, caused by a cold or allergies. Use a continuous positive airway pressure (CPAP) breathing machine if lifestyle changes do not help your apnea and your doctor recommends it. The machine keeps your airway from closing when you sleep. If CPAP does not help you, ask your doctor whether you should try other breathing machines. A bilevel positive airway pressure machine has two types of air pressureâone for breathing in and one for breathing out. Another device raises or lowers air pressure as needed while you breathe. If your nose feels dry or bleeds when using one of these machines, talk with your doctor about increasing moisture in the air. A humidifier may help.   If your nose is runny or stuffy from using a breathing machine, talk with your doctor about using decongestants or a corticosteroid nasal spray.  When should you call for help? Watch closely for changes in your health, and be sure to contact your doctor if:  You still have sleep apnea even though you have made lifestyle changes. You are thinking of trying a device such as CPAP. You are having problems using a CPAP or similar machine. Where can you learn more? Go to "[employer]. Enter W867 in the search box to learn more about \"Sleep Apnea: After Your Visit. \"   © 5613-2100 [employer]wise, Incorporated. Care instructions adapted under license by [facility] (which disclaims liability or warranty for this information). This care instruction is for use with your licensed healthcare professional. If you have questions about a medical condition or this instruction, always ask your healthcare professional. Vickye Messing any warranty or liability for your use of this information. PROPER SLEEP HYGIENE    What to avoid  Do not have drinks with caffeine, such as coffee or black tea, for 8 hours before bed. Do not smoke or use other types of tobacco near bedtime. Nicotine is a stimulant and can keep you awake. Avoid drinking alcohol late in the evening, because it can cause you to wake in the middle of the night. Do not eat a big meal close to bedtime. If you are hungry, eat a light snack. Do not drink a lot of water close to bedtime, because the need to urinate may wake you up during the night. Do not read or watch TV in bed. Use the bed only for sleeping and sexual activity. What to try  Go to bed at the same time every night, and wake up at the same time every morning. Do not take naps during the day. Keep your bedroom quiet, dark, and cool. Get regular exercise, but not within 3 to 4 hours of your bedtime. .  Sleep on a comfortable pillow and mattress. If watching the clock makes you anxious, turn it facing away from you so you cannot see the time.   If you worry when you lie down, start a worry book. Well before bedtime, write down your worries, and then set the book and your concerns aside. Try meditation or other relaxation techniques before you go to bed. If you cannot fall asleep, get up and go to another room until you feel sleepy. Do something relaxing. Repeat your bedtime routine before you go to bed again. Make your house quiet and calm about an hour before bedtime. Turn down the lights, turn off the TV, log off the computer, and turn down the volume on music. This can help you relax after a busy day. Drowsy Driving  The [address] [place] [address] Road Traffic Safety Administration cites drowsiness as a causing factor in more than 564,077 police reported crashes annually, resulting in 76,000 injuries and 1,500 deaths. Other surveys suggest 55% of people polled have driven while drowsy in the past year, 23% had fallen asleep but not crashed, 3% crashed, and 2% had and accident due to drowsy driving. Who is at risk? Young Drivers: One study of drowsy driving accidents states that 55% of the drivers were under 25 years. Of those, 75% were male. Shift Workers and Travelers: People who work overnight or travel across time zones frequently are at higher risk of experiencing Circadian Rhythm Disorders. They are trying to work and function when their body is programed to sleep. Sleep Deprived: Lack of sleep has a serious impact on your ability to pay attention or focus on a task. Consistently getting less than the average of 8 hours your body needs creates partial or cumulative sleep deprivation. Untreated Sleep Disorders: Sleep Apnea, Narcolepsy, R.L.S., and other sleep disorders (untreated) prevent a person from getting enough restful sleep. This leads to excessive daytime sleepiness and increases the risk for drowsy driving accidents by up to 7 times. Medications / Alcohol: Even over the counter medications can cause drowsiness.  Medications that impair a drivers attention should have a warning label. Alcohol naturally makes you sleepy and on its own can cause accidents. Combined with excessive drowsiness its effects are amplified. Signs of Drowsy Driving:   * You don't remember driving the last few miles   * You may drift out of your pedro   * You are unable to focus and your thoughts wander   * You may yawn more often than normal   * You have difficulty keeping your eyes open / nodding off   * Missing traffic signs, speeding, or tailgating  Prevention-   Good sleep hygiene, lifestyle and behavioral choices have the most impact on drowsy driving. There is no substitute for sleep and the average person requires 8 hours nightly. If you find yourself driving drowsy, stop and sleep. Consider the sleep hygiene tips provided during your visit as well. Medication Refill Policy: Refills for all medications require 1 week advance notice. Please have your pharmacy fax a refill request. We are unable to fax, or call in \"controled substance\" medications and you will need to pick these prescriptions up from our office. [employer] Activation    Thank you for requesting access to [employer] Please follow the instructions below to securely access and download your online medical record. [employer] allows you to send messages to your doctor, view your test results, renew your prescriptions, schedule appointments, and more. How Do I Sign Up? In your internet browser, go to [email or web address]. Click on the First Time User? Click Here link in the Sign In box. You will see the New Member Sign Up page. Enter your [employer] Access Code exactly as it appears below. You will not need to use this code after youve completed the sign-up process. If you do not sign up before the expiration date, you must request a new code. [employer] Access Code:  Activation code not generated  Current [employer] Status: Active (This is the date your [employer] access code will )    Enter the last four digits of your Social Security Number (xxxx) and Date of Birth (mm/dd/yyyy) as indicated and click Submit. You will be taken to the next sign-up page. Create a [employer] ID. This will be your [employer] login ID and cannot be changed, so think of one that is secure and easy to remember. Create a [employer] password. You can change your password at any time. Enter your Password Reset Question and Answer. This can be used at a later time if you forget your password. Enter your e-mail address. You will receive e-mail notification when new information is available in [address] Click Sign Up. You can now view and download portions of your medical record. Click the [email or web address] link to download a portable copy of your medical information. Additional Information    If you have questions, please call [phone]. Remember, [employer] is NOT to be used for urgent needs. For medical emergencies, dial 911.

## 2023-03-20 NOTE — PROGRESS NOTES
[address] [facility] [place]. [place], [address]  Tel.  [phone]  Fax. 100 [facility] [address]  [place], [address] [facility]  Tel.  [phone]  Fax. [phone] [address]  Tel.  [phone]  Fax. [phone] Jessica Fallon is a 65y.o. year old female referred by Nena Marquis MD   for evaluation of a sleep disorder. ASSESSMENT/PLAN:      ICD-10-CM ICD-9-CM    1. CHELSEA (obstructive sleep apnea)  G47.33 327.23 SLEEP STUDY UNATTENDED, 4 CHANNEL      2. Inadequate sleep hygiene  Z72.821 307.49       3. H/O anxiety disorder  Z86.59 V11.8       4. H/O allergic rhinitis  Z87.09 V12.69           Patient has a history and examination consistent with the diagnosis of sleep apnea. Follow-up and Dispositions    Return for telephone follow-up after testing is completed. * The patient currently has a Low Risk for having sleep apnea. STOP-BANG score 3.    *Suggested exercising in the afternoon / early evening, leave bed and read from a traditional book following an early morning awakening, assess for CHELSEA / UARS. If symptoms persist / worsen consider testing for narcolepsy. * Sleep testing was ordered for initial evaluation. Orders Placed This Encounter    SLEEP STUDY UNATTENDED, 4 CHANNEL     Order Specific Question:   Reason for Exam     Answer:   CHELSEA       * She was provided information on sleep apnea including corresponding risk factors and the importance of proper treatment. * Treatment options were reviewed in detail. she would like to proceed with PAP therapy. Patient will be seen in follow-up in 6-8 weeks after PAP setup to gauge treatment response and adherence to therapy. * The patient was counseled regarding proper sleep hygiene, with emphasis on ensuring sufficient total sleep time; safe driving and the benefits of exercise and weight loss. * All of her questions were addressed.     2. Inadequate Sleep Hygiene - Counseling was provided regarding proper sleep hygiene to include but not limited to effect of multi-media interaction in sleep environment and of the need to use the bed only for sleeping. * Counseling was also provided regarding age appropriate sleep needs and sleep environment safety. Components of CBT-I,  namely paradoxical intention and stimulus control therapy were reviewed. 3. H/O anxiety disorder - currently not on medications, she will continue to monitor her mood and follow up with her care provider for reevaluation/adjustment of medications if warranted. I have reviewed the relationship between mood as it relates to sleep-disordered breathing. 4. H/o Allergic Rhinitis - continue on current regimen, she will continue to monitor her symptoms and follow up with her primary care provider for reevaluation/adjustment of medications if warranted. I have reviewed the relationship between allergic rhinitis as it relates to sleep-disordered breathing. SUBJECTIVE/OBJECTIVE:    Noble Osler is an 65 y.o. female referred for evaluation for a sleep disorder. She complains of snoring associated with snorting, awakening in the middle of the night because of no specific reason, difficulty falling asleep once awakened, feels sleepy during the day, take naps during the day. Symptoms began 2 years ago, unchanged since that time. She usually gets in bed by 08:30 or 09:00 pm almost every night and wakes up at 2;00 or 3:00 am and is then unable to fall asleep until 04:00 or 05:00 am and can return to sleep for another hour waking up refreshed in the morning but becomes tired if inactive and can easily fall asleep. She does take a nap 2-3 times per week typically at about 2:00 pm and is re-charged after a 10 or 15 nap. Family or house members note snoring, snorting. She denies of symptoms indicative of cataplexy, sleep paralysis or sleep related hallucinations.     She denies of a history of unusual movements occurring during sleep. She reports of a restless sensation in her legs when television in the evenings. Elena Milner does wake up frequently at night. She is bothered by waking up too early and left unable to get back to sleep. She actually sleeps about 1 hours at night and wakes up about -2 times during the night. She does not work shifts: Parker Simon indicates she does not get too little sleep at night. Her bedtime is 2100. She awakens at 0500. She does take naps. She takes 3 naps a week lasting 10 to 15, Minute(s). She has the following observed behaviors: Loud snoring, Grinding teeth;  . Other remarks: The patient has not undergone diagnostic testing for the current problems. Review of Systems:  Constitutional:  No significant weight loss or weight gain  Eyes:  No blurred vision  CVS:  No significant chest pain  Pulm:  No significant shortness of breath  GI:  No significant nausea or vomiting  :  No significant nocturia  Musculoskeletal:  No significant joint pain at night  Skin:  No significant rashes  Neuro:  No significant dizziness   Psych:  No active mood issues    Sleep Review of Systems: notable for Negative difficulty falling asleep; Positive awakenings at night; Positive perceived regular dreaming; Negative nightmares; Negative  early morning headaches; declining memory attributed to stress; Negative  concentration issues; Negative caffeine;  Negative alcohol;   Negative history of any automobile or occupational accidents due to daytime drowsiness. [place] Sleepiness Score: 12   and Modified F.O.S.Q. Score Total / 2: 16    Visit Vitals  /69 (BP 1 Location: Left upper arm, BP Patient Position: Sitting)   Pulse 75   Ht 5' 7\" (1.702 m)   Wt 155 lb 9.6 oz (70.6 kg)   SpO2 97%   BMI 24.37 kg/m²    Neck circ.  in \"inches\": 14      General:   Alert, oriented, not in acute distress   Eyes:  Anicteric Sclerae; intact EOM's   Nose:  No obvious nasal septum deviation    Oropharynx:   Mallampati score 3, thick tongue base, uvula partially seen due to low-lying soft palate   Neck:   midline trachea,  no JVD   Chest/Lungs:  symmetrical lung expansion ,clear lung fields on auscultation    CVS:  Normal rate, regular rhythm    Extremities:  No obvious rashes, absent edema    Neuro:  No focal deficits; No obvious tremor    Psych:  Normal eye contact; normal  affect, normal countenance          Filiberto Moore MD, FAASM  Diplomate American Board of Sleep Medicine  Diplomate in Sleep Medicine - ABP    Electronically signed.  03/20/23

## 2023-04-13 ENCOUNTER — DOCUMENTATION ONLY (OUTPATIENT)
Dept: SLEEP MEDICINE | Age: 58
End: 2023-04-13

## 2023-05-03 ENCOUNTER — DOCUMENTATION ONLY (OUTPATIENT)
Dept: SLEEP MEDICINE | Age: 58
End: 2023-05-03

## 2023-05-03 ENCOUNTER — TELEPHONE (OUTPATIENT)
Dept: SLEEP MEDICINE | Age: 58
End: 2023-05-03

## 2023-05-03 DIAGNOSIS — G47.33 OSA (OBSTRUCTIVE SLEEP APNEA): Primary | ICD-10-CM

## 2023-06-09 ENCOUNTER — TELEPHONE (OUTPATIENT)
Age: 58
End: 2023-06-09

## 2023-06-09 NOTE — TELEPHONE ENCOUNTER
Patient called to follow up on the status of receiving her repeat HSAT device because she has not received it or heard anything else; called the [facility] who informed me they had everything they needed and will be reaching out to the patient.